# Patient Record
Sex: FEMALE | Race: WHITE | NOT HISPANIC OR LATINO | Employment: FULL TIME | URBAN - METROPOLITAN AREA
[De-identification: names, ages, dates, MRNs, and addresses within clinical notes are randomized per-mention and may not be internally consistent; named-entity substitution may affect disease eponyms.]

---

## 2022-01-18 ENCOUNTER — VBI (OUTPATIENT)
Dept: ADMINISTRATIVE | Facility: OTHER | Age: 67
End: 2022-01-18

## 2022-01-18 NOTE — TELEPHONE ENCOUNTER
Upon review of the In Basket request we were able to locate, review, and update the patient chart as requested for Mammogram and Pap Smear (HPV) aka Cervical Cancer Screening  Any additional questions or concerns should be emailed to the Practice Liaisons via [email or web address] email, please do not reply via In Basket      Thank you  Funmilayo Katz

## 2022-01-25 ENCOUNTER — ANNUAL EXAM (OUTPATIENT)
Dept: OBGYN CLINIC | Facility: CLINIC | Age: 67
End: 2022-01-25
Payer: COMMERCIAL

## 2022-01-25 VITALS
SYSTOLIC BLOOD PRESSURE: 140 MMHG | BODY MASS INDEX: 35.38 KG/M2 | HEIGHT: 61 IN | WEIGHT: 187.4 LBS | DIASTOLIC BLOOD PRESSURE: 70 MMHG

## 2022-01-25 DIAGNOSIS — Z13.820 SCREENING FOR OSTEOPOROSIS: ICD-10-CM

## 2022-01-25 DIAGNOSIS — Z12.31 ENCOUNTER FOR SCREENING MAMMOGRAM FOR BREAST CANCER: ICD-10-CM

## 2022-01-25 DIAGNOSIS — Z01.419 ENCOUNTER FOR ANNUAL ROUTINE GYNECOLOGICAL EXAMINATION: Primary | ICD-10-CM

## 2022-01-25 DIAGNOSIS — E28.39 ESTROGEN DEFICIENCY: ICD-10-CM

## 2022-01-25 PROCEDURE — 99396 PREV VISIT EST AGE 40-64: CPT | Performed by: OBSTETRICS & GYNECOLOGY

## 2022-01-25 RX ORDER — GLIPIZIDE 10 MG/1
TABLET, FILM COATED, EXTENDED RELEASE ORAL
COMMUNITY
Start: 2021-12-30

## 2022-01-25 RX ORDER — LISINOPRIL 10 MG/1
1 TABLET ORAL DAILY
COMMUNITY
End: 2022-01-25 | Stop reason: SDUPTHER

## 2022-01-25 RX ORDER — ASPIRIN 81 MG/1
TABLET, CHEWABLE ORAL EVERY 24 HOURS
COMMUNITY

## 2022-01-25 RX ORDER — HYDROCHLOROTHIAZIDE 25 MG/1
TABLET ORAL
COMMUNITY
Start: 2021-11-02

## 2022-01-25 RX ORDER — LISINOPRIL 10 MG/1
TABLET ORAL
COMMUNITY
Start: 2021-11-10

## 2022-01-25 NOTE — LETTER
2022     Theodore Lovett, [address]  [place] [facility] [place] [address]    Patient: Sofi Juarez   YOB: 1955   Date of Visit: 2022       Dear Dr Aimee Guthrie: Thank you for referring Talisha Aguirre to me for evaluation  Below are my notes for this consultation  If you have questions, please do not hesitate to call me  I look forward to following your patient along with you  Sincerely,        Luis Eduardo Martines MD        CC: No Recipients  Luis Eduardo Martines MD  2022  5:54 PM  Sign when Signing Visit  Annual [address] [address], [address], [facility], [address]    ASSESSMENT/PLAN: Sofi Juarez is a 61 y o   who presents for annual gynecologic exam     Encounter for routine gynecologic examination  - Routine well woman exam completed today  - Cervical Cancer Screening: Current ASCCP Guidelines reviewed  Last Pap: 2020 normal  Next Pap Due: 2-3 years, routine   - Breast Cancer Screening: Last Mammogram 2021 normal per pt  - Colorectal cancer screening last  per pt, next due   - The following were reviewed in today's visit: mammography screening ordered, osteoporosis, adequate intake of calcium and vitamin D, exercise, healthy diet and DEXA ordered    Additional problems addressed during this visit:  1  Encounter for annual routine gynecological examination    2  Encounter for screening mammogram for breast cancer  -     Mammo screening bilateral w 3d & cad; Future; Expected date: 2022    3  Estrogen deficiency  -     DXA bone density spine hip and pelvis; Future    4  Screening for osteoporosis  -     DXA bone density spine hip and pelvis; Future        Next visit: 1 year WEllness      CC:  Annual Gynecologic Examination    HPI: Sofi Juarez is a 61 y o   who presents for annual gynecologic examination  She denies any breast, urinary or pelvic issues at today's visit      Patient states she has an umbilical hernia which may require surgical correction, she has seen surgeon, Dr Lindsey Martell  She has a long term issue with constipation, needing to take fiber frequently  Gyn History  No LMP recorded  Patient is postmenopausal      Last Pap: 2020 was normal    She  reports previously being sexually active  OB History      Past Medical History:  No date: Anxiety and depression  No date: Diabetes mellitus (HCC)      Comment:  Type 2  No date: Hypertension  No date: Obesity  No date: Umbilical hernia     Past Surgical History:  No date:  SECTION      Comment:  X2  2013: MOLE REMOVAL      Comment:  Squamous cell carcinoma mole removed from left groin     Family History   Problem Relation Age of Onset    Heart disease Sister     Diabetes Sister     Heart disease Brother     Breast cancer Neg Hx         Social History     Tobacco Use    Smoking status: Never Smoker    Smokeless tobacco: Never Used   Vaping Use    Vaping Use: Never used   Substance Use Topics    Alcohol use: Yes     Comment: occ    Drug use: Never          Current Outpatient Medications:     Ascorbic Acid (Vitamin C) 500 MG/5ML LIQD, as directed, Disp: , Rfl:     aspirin 81 mg chewable tablet, every 24 hours, Disp: , Rfl:     Calcium-Magnesium-Zinc 333-133-5 MG TABS, , Disp: , Rfl:     Cholecalciferol (Vitamin D-3) 5000 UNIT/ML LIQD, every 24 hours, Disp: , Rfl:     glipiZIDE (GLUCOTROL XL) 10 mg 24 hr tablet, , Disp: , Rfl:     hydrochlorothiazide (HYDRODIURIL) 25 mg tablet, , Disp: , Rfl:     lisinopril (ZESTRIL) 10 mg tablet, , Disp: , Rfl:     metFORMIN (GLUCOPHAGE) 1000 MG tablet, Every 12 hours, Disp: , Rfl:     Pediatric Multivitamins-Fl (Multivitamin/Fluoride) 1 MG CHEW, every 24 hours, Disp: , Rfl:     She is allergic to sulfa antibiotics       ROS negative except as noted in HPI    Objective:  /70 (BP Location: Left arm, Patient Position: Sitting, Cuff Size: Standard) Ht 5' 0 5" (1 537 m)   Wt 85 kg (187 lb 6 4 oz)   BMI 36 00 kg/m²      Physical Exam  Constitutional:       Appearance: Normal appearance  Chest:   Breasts:      Right: Normal  No mass, tenderness or axillary adenopathy  Left: Normal  No mass, tenderness or axillary adenopathy  Abdominal:      Palpations: Abdomen is soft  Tenderness: There is no abdominal tenderness  Genitourinary:     General: Normal vulva  Vagina: No bleeding or lesions  Cervix: Normal       Uterus: Normal  Not tender  Adnexa:         Right: No mass or tenderness  Left: No mass or tenderness  Rectum: No mass or external hemorrhoid  Normal anal tone  Comments: Atrophic changes appropriate to age  Musculoskeletal:         General: Normal range of motion  Lymphadenopathy:      Upper Body:      Right upper body: No axillary adenopathy  Left upper body: No axillary adenopathy  Neurological:      Mental Status: She is alert and oriented to person, place, and time     Psychiatric:         Mood and Affect: Mood normal          Behavior: Behavior normal

## 2022-01-25 NOTE — PROGRESS NOTES
Annual Wellness Visit  [address], [address], [facility], [address] [facility]    ASSESSMENT/PLAN: Benjamin Selby is a 61 y o   who presents for annual gynecologic exam     Encounter for routine gynecologic examination  - Routine well woman exam completed today  - Cervical Cancer Screening: Current ASCCP Guidelines reviewed  Last Pap: 2020 normal  Next Pap Due: 2-3 years, routine   - Breast Cancer Screening: Last Mammogram 2021 normal per pt  - Colorectal cancer screening last  per pt, next due   - The following were reviewed in today's visit: mammography screening ordered, osteoporosis, adequate intake of calcium and vitamin D, exercise, healthy diet and DEXA ordered    Additional problems addressed during this visit:  1  Encounter for annual routine gynecological examination    2  Encounter for screening mammogram for breast cancer  -     Mammo screening bilateral w 3d & cad; Future; Expected date: 2022    3  Estrogen deficiency  -     DXA bone density spine hip and pelvis; Future    4  Screening for osteoporosis  -     DXA bone density spine hip and pelvis; Future        Next visit: 1 year WEllness      CC:  Annual Gynecologic Examination    HPI: Benjamin Selby is a 61 y o   who presents for annual gynecologic examination  She denies any breast, urinary or pelvic issues at today's visit  Patient states she has an umbilical hernia which may require surgical correction, she has seen surgeon, Dr Natalie Huang  She has a long term issue with constipation, needing to take fiber frequently  Gyn History  No LMP recorded  Patient is postmenopausal      Last Pap: 2020 was normal    She  reports previously being sexually active  OB History      Past Medical History:  No date:  Anxiety and depression  No date: Diabetes mellitus (HCC)      Comment:  Type 2  No date: Hypertension  No date: Obesity  No date: Umbilical hernia     Past Surgical History:  No date:  SECTION      Comment:  X2  : MOLE REMOVAL      Comment:  Squamous cell carcinoma mole removed from left groin     Family History   Problem Relation Age of Onset    Heart disease Sister     Diabetes Sister     Heart disease Brother     Breast cancer Neg Hx         Social History     Tobacco Use    Smoking status: Never Smoker    Smokeless tobacco: Never Used   Vaping Use    Vaping Use: Never used   Substance Use Topics    Alcohol use: Yes     Comment: occ    Drug use: Never          Current Outpatient Medications:     Ascorbic Acid (Vitamin C) 500 MG/5ML LIQD, as directed, Disp: , Rfl:     aspirin 81 mg chewable tablet, every 24 hours, Disp: , Rfl:     Calcium-Magnesium-Zinc 333-133-5 MG TABS, , Disp: , Rfl:     Cholecalciferol (Vitamin D-3) 5000 UNIT/ML LIQD, every 24 hours, Disp: , Rfl:     glipiZIDE (GLUCOTROL XL) 10 mg 24 hr tablet, , Disp: , Rfl:     hydrochlorothiazide (HYDRODIURIL) 25 mg tablet, , Disp: , Rfl:     lisinopril (ZESTRIL) 10 mg tablet, , Disp: , Rfl:     metFORMIN (GLUCOPHAGE) 1000 MG tablet, Every 12 hours, Disp: , Rfl:     Pediatric Multivitamins-Fl (Multivitamin/Fluoride) 1 MG CHEW, every 24 hours, Disp: , Rfl:     She is allergic to sulfa antibiotics       ROS negative except as noted in HPI    Objective:  /70 (BP Location: Left arm, Patient Position: Sitting, Cuff Size: Standard)   Ht 5' 0 5" (1 537 m)   Wt 85 kg (187 lb 6 4 oz)   BMI 36 00 kg/m²      Physical Exam  Constitutional:       Appearance: Normal appearance  Chest:   Breasts:      Right: Normal  No mass, tenderness or axillary adenopathy  Left: Normal  No mass, tenderness or axillary adenopathy  Abdominal:      Palpations: Abdomen is soft  Tenderness: There is no abdominal tenderness  Genitourinary:     General: Normal vulva  Vagina: No bleeding or lesions  Cervix: Normal       Uterus: Normal  Not tender         Adnexa:         Right: No mass or tenderness  Left: No mass or tenderness  Rectum: No mass or external hemorrhoid  Normal anal tone  Comments: Atrophic changes appropriate to age  Musculoskeletal:         General: Normal range of motion  Lymphadenopathy:      Upper Body:      Right upper body: No axillary adenopathy  Left upper body: No axillary adenopathy  Neurological:      Mental Status: She is alert and oriented to person, place, and time     Psychiatric:         Mood and Affect: Mood normal          Behavior: Behavior normal

## 2023-01-04 DIAGNOSIS — E28.39 ESTROGEN DEFICIENCY: ICD-10-CM

## 2023-01-04 DIAGNOSIS — Z13.820 SCREENING FOR OSTEOPOROSIS: ICD-10-CM

## 2023-01-20 DIAGNOSIS — Z12.31 ENCOUNTER FOR SCREENING MAMMOGRAM FOR BREAST CANCER: ICD-10-CM

## 2023-03-13 ENCOUNTER — TELEPHONE (OUTPATIENT)
Dept: OBGYN CLINIC | Facility: CLINIC | Age: 68
End: 2023-03-13

## 2023-03-13 NOTE — TELEPHONE ENCOUNTER
Shelly Madison Representative left a message that she was notified by [facility], our office put a screening Mammogram Dx on [facility] Dexa Scan order  Haley was requesting to have a new Dexa Scan order sent to [facility]  Christin Caller [facility] Dexa Scan orders do not have screening Mammogram Dx  Codes  It has for screening Osteoporosis & decreased Estrogen  Left message on Haley's voice mail as to these correct Dx  Codes  Advised for Haley to reach out to [facility]   ( don't know from whom [facility] contacted Haley or where to fax Dexa Scan order too)   call back with any questions